# Patient Record
Sex: MALE | Race: WHITE | NOT HISPANIC OR LATINO | URBAN - METROPOLITAN AREA
[De-identification: names, ages, dates, MRNs, and addresses within clinical notes are randomized per-mention and may not be internally consistent; named-entity substitution may affect disease eponyms.]

---

## 2023-09-12 ENCOUNTER — HOSPITAL ENCOUNTER (EMERGENCY)
Facility: OTHER | Age: 18
Discharge: HOME | End: 2023-09-12
Attending: EMERGENCY MEDICINE
Payer: COMMERCIAL

## 2023-09-12 VITALS
TEMPERATURE: 98 F | WEIGHT: 155 LBS | HEART RATE: 72 BPM | DIASTOLIC BLOOD PRESSURE: 72 MMHG | RESPIRATION RATE: 17 BRPM | OXYGEN SATURATION: 100 % | SYSTOLIC BLOOD PRESSURE: 118 MMHG

## 2023-09-12 DIAGNOSIS — Z87.39 H/O CLOSED DISLOCATION OF SHOULDER: ICD-10-CM

## 2023-09-12 DIAGNOSIS — M25.519 SHOULDER PAIN: ICD-10-CM

## 2023-09-12 DIAGNOSIS — S46.911A STRAIN OF RIGHT SHOULDER, INITIAL ENCOUNTER: Primary | ICD-10-CM

## 2023-09-12 PROCEDURE — 99284 EMERGENCY DEPT VISIT MOD MDM: CPT | Mod: 25

## 2023-09-12 PROCEDURE — 63600175 PHARM REV CODE 636 W HCPCS: Performed by: NURSE PRACTITIONER

## 2023-09-12 PROCEDURE — 96374 THER/PROPH/DIAG INJ IV PUSH: CPT

## 2023-09-12 RX ORDER — METHOCARBAMOL 500 MG/1
1000 TABLET, FILM COATED ORAL 3 TIMES DAILY
Qty: 30 TABLET | Refills: 0 | Status: SHIPPED | OUTPATIENT
Start: 2023-09-12 | End: 2023-09-17

## 2023-09-12 RX ORDER — MORPHINE SULFATE 4 MG/ML
4 INJECTION, SOLUTION INTRAMUSCULAR; INTRAVENOUS ONCE
Status: DISCONTINUED | OUTPATIENT
Start: 2023-09-12 | End: 2023-09-13 | Stop reason: HOSPADM

## 2023-09-12 RX ORDER — KETOROLAC TROMETHAMINE 30 MG/ML
15 INJECTION, SOLUTION INTRAMUSCULAR; INTRAVENOUS
Status: COMPLETED | OUTPATIENT
Start: 2023-09-12 | End: 2023-09-12

## 2023-09-12 RX ORDER — NAPROXEN 375 MG/1
375 TABLET ORAL 2 TIMES DAILY WITH MEALS
Qty: 60 TABLET | Refills: 0 | Status: SHIPPED | OUTPATIENT
Start: 2023-09-12

## 2023-09-12 RX ADMIN — KETOROLAC TROMETHAMINE 15 MG: 30 INJECTION, SOLUTION INTRAMUSCULAR at 09:09

## 2023-09-13 ENCOUNTER — TELEPHONE (OUTPATIENT)
Dept: SPORTS MEDICINE | Facility: CLINIC | Age: 18
End: 2023-09-13
Payer: COMMERCIAL

## 2023-09-13 NOTE — ED PROVIDER NOTES
Source of History:  Patient     Chief complaint:  Shoulder Injury (Pt felt a pop in right shoulder after falling on it while playing soccer. Hx dislocation)      HPI:  Red Arias is a 18 y.o. male presenting to the emergency department with right shoulder pain that began after falling while playing soccer.  States that he felt a pop, history of shoulder dislocation.      This is the extent to the patients complaints today here in the emergency department.    PMH:  As per HPI and below:  No past medical history on file.  No past surgical history on file.         Review of patient's allergies indicates:   Allergen Reactions    Nuts [tree nut]     Shellfish containing products        ROS: As per HPI and below:  General: No fever.  No chills.  Eyes: No visual changes.   ENT: No sore throat. No ear pain.  Urinary: No abnormal urination.  MSK:  Shoulder pain  Integument:  Abrasion to anterior left knee      Physical Exam:    /66   Pulse 70   Temp 99 °F (37.2 °C)   Resp 18   Wt 70.3 kg (155 lb)   SpO2 100%   Vitals:    09/12/23 2118 09/12/23 2133   BP: 107/61 111/66   Pulse: 75 70   Resp: 19 18   Temp: 99.7 °F (37.6 °C) 99 °F (37.2 °C)   TempSrc: Oral    SpO2: 98% 100%   Weight: 70.3 kg (155 lb)        Nursing note and vital signs reviewed.  Appearance: No acute distress.  Eyes: No conjunctival injection.  Extraocular muscles are intact.  ENT: Normal phonation.  Cardio:  Radial pulse +2 bilaterally.  Musculoskeletal:  Pain with any range of motion of right shoulder, no deformity noted.  He is able to touch his contralateral shoulder without difficulty.  No bruising, swelling noted to the joint.  Skin: No rashes seen.  Good turgor.  Superficial abrasion noted to the anterior left knee  No ecchymoses.  Neuro:   is equal bilaterally  Mental Status:  Alert and oriented x 3.  Appropriate, conversant.    Initial MDM:  Thirty 18-year-old male with possible right shoulder dislocation.  History of similar in the  past.  On exam there does not appear to be a deformity.  Will obtain x-rays to rule out dislocation.    Labs Reviewed - No data to display    X-ray Shoulder 2 or More Views Right   Final Result      No acute process.         Electronically signed by: Tanmya Cardoza MD   Date:    09/12/2023   Time:    21:51            Initial Impression/ Differential Dx:  Differential Diagnosis includes, but is not limited to:  Shoulder dislocation, fracture, AC separation, compartment syndrome, nerve injury/palsy, impingement syndrome, hemarthrosis, rotator cuff injury, tendonitis, muscle strain, ligament tear/sprain,     Medical Decision Making  Amount and/or Complexity of Data Reviewed  Radiology: ordered.    Risk  Prescription drug management.         MDM:    18 y.o. male with right shoulder pain after falling while playing soccer and feeling a pop.  Reports a history of shoulder dislocation.  On arrival there was no overt deformity noted to the right shoulder.  He was able to demonstrate debility touches contralateral shoulder without difficulty.  X-rays reveal no acute fractures or dislocations.  Likely strain/sprain.  Patient was placed in a sling for comfort and I discussed exercises prevent frozen shoulder.  Possibility he dislocated and was able to reduce it prior to arrival in the emergency department.  There is likely rotator cuff injury.  Referral for Orthopedics was placed with the patient.  I discussed needs close follow-up.  He was agreeable with this plan         Diagnostic Impression:    1. Strain of right shoulder, initial encounter    2. Shoulder pain    3. H/O closed dislocation of shoulder         ED Disposition Condition    Discharge Stable            ED Prescriptions       Medication Sig Dispense Start Date End Date Auth. Provider    naproxen (NAPROSYN) 375 MG tablet Take 1 tablet (375 mg total) by mouth 2 (two) times daily with meals. 60 tablet 9/12/2023 -- Jacqui Conner, FNP    methocarbamoL (ROBAXIN) 500 MG  Tab Take 2 tablets (1,000 mg total) by mouth 3 (three) times daily. for 5 days 30 tablet 9/12/2023 9/17/2023 Jacqui Conner, DESTINY          Follow-up Information       Follow up With Specialties Details Why Contact Info    [facility] Emergency Dept Emergency Medicine Go to  If symptoms worsen [id number] [facility] [id number]  [phone]               Jacqui Conner, DESTINY  09/12/23 5245

## 2023-09-13 NOTE — TELEPHONE ENCOUNTER
LVM for patient regarding appointment for shoulder after soccer injury and ED visit.     Told to call back when received message.